# Patient Record
Sex: MALE | Race: AMERICAN INDIAN OR ALASKA NATIVE | Employment: FULL TIME | URBAN - METROPOLITAN AREA
[De-identification: names, ages, dates, MRNs, and addresses within clinical notes are randomized per-mention and may not be internally consistent; named-entity substitution may affect disease eponyms.]

---

## 2023-07-22 ENCOUNTER — OFFICE VISIT (OUTPATIENT)
Dept: URGENT CARE | Facility: URGENT CARE | Age: 43
End: 2023-07-22
Payer: MEDICAID

## 2023-07-22 VITALS
DIASTOLIC BLOOD PRESSURE: 82 MMHG | TEMPERATURE: 97.6 F | WEIGHT: 234.8 LBS | OXYGEN SATURATION: 97 % | SYSTOLIC BLOOD PRESSURE: 115 MMHG | HEART RATE: 97 BPM

## 2023-07-22 DIAGNOSIS — N49.2 SCROTAL ABSCESS: ICD-10-CM

## 2023-07-22 DIAGNOSIS — Z48.00 CHANGE OF DRESSING: Primary | ICD-10-CM

## 2023-07-22 PROCEDURE — 99202 OFFICE O/P NEW SF 15 MIN: CPT | Performed by: STUDENT IN AN ORGANIZED HEALTH CARE EDUCATION/TRAINING PROGRAM

## 2023-07-22 NOTE — PROGRESS NOTES
Assessment & Plan     Change of dressing  Scrotal abscess  Here for dressing change of right scrotal abscess. About 6 hrs away from home and usually nurse does changes for him daily at home. Came to  for dressing change. Brought instructions and supplies with him. Wound is clean and repacked with iodoform according to instructions. Return to clinic tomorrow for dressing change again.     Annalee Go MD  [facility] Katz is a 42 year old, presenting for the following health issues:  Wound Check and Dressing Change (HE HAVE A 2 WEEKS, HE FEELING A BATTER. )         No data to display              HPI     Right scrotal abscess  hx of T2DM, had MSSA right scrotal abscess. I&D on 7/11. Penrose removed by urology 7/13. Taking Keflex 1g TID until 7/24 per ID. DM managed outpatient.  Tolerating dressing changes well without much pain. Using less iodoform gauze.     Review of Systems   Constitutional, HEENT, cardiovascular, pulmonary, gi and gu systems are negative, except as otherwise noted.      Objective    /82   Pulse 97   Temp 97.6  F (36.4  C) (Tympanic)   Wt 106.5 kg (234 lb 12.8 oz)   SpO2 97%   There is no height or weight on file to calculate BMI.  Physical Exam   GENERAL: healthy, alert and no distress   (male): R scrotal abscess, approx 2 cm deep wound by probing and 2 cm. Clean wound. Irrigated with saline flush. Approx 10 cm iodoform gauze repacked into wound.